# Patient Record
Sex: MALE | Employment: UNEMPLOYED | URBAN - NONMETROPOLITAN AREA
[De-identification: names, ages, dates, MRNs, and addresses within clinical notes are randomized per-mention and may not be internally consistent; named-entity substitution may affect disease eponyms.]

---

## 2022-12-01 ENCOUNTER — HOSPITAL ENCOUNTER (INPATIENT)
Age: 27
LOS: 1 days | Discharge: LEFT BEFORE DISCHARGE | End: 2022-12-02
Attending: STUDENT IN AN ORGANIZED HEALTH CARE EDUCATION/TRAINING PROGRAM | Admitting: HOSPITALIST
Payer: MEDICAID

## 2022-12-01 DIAGNOSIS — E10.10 TYPE 1 DIABETES MELLITUS WITH KETOACIDOSIS WITHOUT COMA (HCC): Primary | ICD-10-CM

## 2022-12-01 LAB
ALBUMIN SERPL-MCNC: 4.9 G/DL (ref 3.5–5.1)
ALP BLD-CCNC: 95 U/L (ref 38–126)
ALT SERPL-CCNC: 19 U/L (ref 11–66)
ANION GAP SERPL CALCULATED.3IONS-SCNC: 26 MEQ/L (ref 8–16)
AST SERPL-CCNC: 17 U/L (ref 5–40)
BASE EXCESS MIXED: -8 MMOL/L (ref -2–3)
BASOPHILS # BLD: 1.1 %
BASOPHILS ABSOLUTE: 0.1 THOU/MM3 (ref 0–0.1)
BETA-HYDROXYBUTYRATE: 66.1 MG/DL (ref 0.2–2.81)
BETA-HYDROXYBUTYRATE: 72.33 MG/DL (ref 0.2–2.81)
BILIRUB SERPL-MCNC: 0.9 MG/DL (ref 0.3–1.2)
BILIRUBIN URINE: NEGATIVE
BLOOD, URINE: NEGATIVE
BUN BLDV-MCNC: 19 MG/DL (ref 7–22)
CALCIUM SERPL-MCNC: 9.8 MG/DL (ref 8.5–10.5)
CHARACTER, URINE: CLEAR
CHLORIDE BLD-SCNC: 84 MEQ/L (ref 98–111)
CO2: 15 MEQ/L (ref 23–33)
COLLECTED BY:: ABNORMAL
COLOR: YELLOW
CREAT SERPL-MCNC: 0.8 MG/DL (ref 0.4–1.2)
DEVICE: ABNORMAL
EOSINOPHIL # BLD: 0.5 %
EOSINOPHILS ABSOLUTE: 0 THOU/MM3 (ref 0–0.4)
ERYTHROCYTE [DISTWIDTH] IN BLOOD BY AUTOMATED COUNT: 12.2 % (ref 11.5–14.5)
ERYTHROCYTE [DISTWIDTH] IN BLOOD BY AUTOMATED COUNT: 38.8 FL (ref 35–45)
GFR SERPL CREATININE-BSD FRML MDRD: > 60 ML/MIN/1.73M2
GLUCOSE BLD-MCNC: 329 MG/DL (ref 70–108)
GLUCOSE BLD-MCNC: 399 MG/DL (ref 70–108)
GLUCOSE BLD-MCNC: 427 MG/DL (ref 70–108)
GLUCOSE BLD-MCNC: 575 MG/DL (ref 70–108)
GLUCOSE BLD-MCNC: 605 MG/DL (ref 70–108)
GLUCOSE URINE: >= 1000 MG/DL
HCO3, MIXED: 18 MMOL/L (ref 23–28)
HCT VFR BLD CALC: 47.6 % (ref 42–52)
HEMOGLOBIN: 16.3 GM/DL (ref 14–18)
IMMATURE GRANS (ABS): 0.03 THOU/MM3 (ref 0–0.07)
IMMATURE GRANULOCYTES: 0.5 %
KETONES, URINE: >= 160
LEUKOCYTE ESTERASE, URINE: NEGATIVE
LYMPHOCYTES # BLD: 18.1 %
LYMPHOCYTES ABSOLUTE: 1.2 THOU/MM3 (ref 1–4.8)
MCH RBC QN AUTO: 30.1 PG (ref 26–33)
MCHC RBC AUTO-ENTMCNC: 34.2 GM/DL (ref 32.2–35.5)
MCV RBC AUTO: 87.8 FL (ref 80–94)
MONOCYTES # BLD: 4.9 %
MONOCYTES ABSOLUTE: 0.3 THOU/MM3 (ref 0.4–1.3)
NITRITE, URINE: NEGATIVE
NUCLEATED RED BLOOD CELLS: 0 /100 WBC
O2 SAT, MIXED: 59 %
OSMOLALITY CALCULATION: 281.9 MOSMOL/KG (ref 275–300)
PCO2, MIXED VENOUS: 40 MMHG (ref 41–51)
PH UA: 5 (ref 5–9)
PH, MIXED: 7.27 (ref 7.31–7.41)
PLATELET # BLD: 206 THOU/MM3 (ref 130–400)
PMV BLD AUTO: 11 FL (ref 9.4–12.4)
PO2 MIXED: 35 MMHG (ref 25–40)
POTASSIUM SERPL-SCNC: 5.1 MEQ/L (ref 3.5–5.2)
PROTEIN UA: NEGATIVE
RBC # BLD: 5.42 MILL/MM3 (ref 4.7–6.1)
SEG NEUTROPHILS: 74.9 %
SEGMENTED NEUTROPHILS ABSOLUTE COUNT: 4.9 THOU/MM3 (ref 1.8–7.7)
SODIUM BLD-SCNC: 125 MEQ/L (ref 135–145)
SPECIFIC GRAVITY, URINE: 1.03 (ref 1–1.03)
TOTAL PROTEIN: 7.6 G/DL (ref 6.1–8)
UROBILINOGEN, URINE: 0.2 EU/DL (ref 0–1)
WBC # BLD: 6.5 THOU/MM3 (ref 4.8–10.8)

## 2022-12-01 PROCEDURE — 2140000000 HC CCU INTERMEDIATE R&B

## 2022-12-01 PROCEDURE — 2580000003 HC RX 258: Performed by: STUDENT IN AN ORGANIZED HEALTH CARE EDUCATION/TRAINING PROGRAM

## 2022-12-01 PROCEDURE — 6360000002 HC RX W HCPCS: Performed by: STUDENT IN AN ORGANIZED HEALTH CARE EDUCATION/TRAINING PROGRAM

## 2022-12-01 PROCEDURE — 80053 COMPREHEN METABOLIC PANEL: CPT

## 2022-12-01 PROCEDURE — 99285 EMERGENCY DEPT VISIT HI MDM: CPT

## 2022-12-01 PROCEDURE — 85025 COMPLETE CBC W/AUTO DIFF WBC: CPT

## 2022-12-01 PROCEDURE — 36415 COLL VENOUS BLD VENIPUNCTURE: CPT

## 2022-12-01 PROCEDURE — 51798 US URINE CAPACITY MEASURE: CPT

## 2022-12-01 PROCEDURE — 96365 THER/PROPH/DIAG IV INF INIT: CPT

## 2022-12-01 PROCEDURE — 82010 KETONE BODYS QUAN: CPT

## 2022-12-01 PROCEDURE — 82948 REAGENT STRIP/BLOOD GLUCOSE: CPT

## 2022-12-01 PROCEDURE — 82803 BLOOD GASES ANY COMBINATION: CPT

## 2022-12-01 PROCEDURE — 99223 1ST HOSP IP/OBS HIGH 75: CPT | Performed by: HOSPITALIST

## 2022-12-01 PROCEDURE — 96375 TX/PRO/DX INJ NEW DRUG ADDON: CPT

## 2022-12-01 PROCEDURE — 81003 URINALYSIS AUTO W/O SCOPE: CPT

## 2022-12-01 PROCEDURE — 6370000000 HC RX 637 (ALT 250 FOR IP): Performed by: STUDENT IN AN ORGANIZED HEALTH CARE EDUCATION/TRAINING PROGRAM

## 2022-12-01 RX ORDER — 0.9 % SODIUM CHLORIDE 0.9 %
1000 INTRAVENOUS SOLUTION INTRAVENOUS ONCE
Status: COMPLETED | OUTPATIENT
Start: 2022-12-01 | End: 2022-12-02

## 2022-12-01 RX ORDER — ONDANSETRON 2 MG/ML
4 INJECTION INTRAMUSCULAR; INTRAVENOUS ONCE
Status: COMPLETED | OUTPATIENT
Start: 2022-12-01 | End: 2022-12-01

## 2022-12-01 RX ORDER — POTASSIUM CHLORIDE 7.45 MG/ML
10 INJECTION INTRAVENOUS PRN
Status: DISCONTINUED | OUTPATIENT
Start: 2022-12-01 | End: 2022-12-02 | Stop reason: HOSPADM

## 2022-12-01 RX ORDER — SODIUM CHLORIDE 9 MG/ML
INJECTION, SOLUTION INTRAVENOUS CONTINUOUS
Status: DISCONTINUED | OUTPATIENT
Start: 2022-12-01 | End: 2022-12-02

## 2022-12-01 RX ORDER — SODIUM CHLORIDE, SODIUM LACTATE, POTASSIUM CHLORIDE, AND CALCIUM CHLORIDE .6; .31; .03; .02 G/100ML; G/100ML; G/100ML; G/100ML
1000 INJECTION, SOLUTION INTRAVENOUS ONCE
Status: DISCONTINUED | OUTPATIENT
Start: 2022-12-01 | End: 2022-12-01

## 2022-12-01 RX ORDER — MAGNESIUM SULFATE IN WATER 40 MG/ML
2000 INJECTION, SOLUTION INTRAVENOUS PRN
Status: DISCONTINUED | OUTPATIENT
Start: 2022-12-01 | End: 2022-12-02 | Stop reason: HOSPADM

## 2022-12-01 RX ORDER — DEXTROSE AND SODIUM CHLORIDE 5; .45 G/100ML; G/100ML
INJECTION, SOLUTION INTRAVENOUS CONTINUOUS PRN
Status: DISCONTINUED | OUTPATIENT
Start: 2022-12-01 | End: 2022-12-02

## 2022-12-01 RX ORDER — POLYETHYLENE GLYCOL 3350 17 G/17G
17 POWDER, FOR SOLUTION ORAL DAILY PRN
Status: DISCONTINUED | OUTPATIENT
Start: 2022-12-01 | End: 2022-12-02 | Stop reason: HOSPADM

## 2022-12-01 RX ORDER — INSULIN GLARGINE 100 [IU]/ML
30 INJECTION, SOLUTION SUBCUTANEOUS NIGHTLY
COMMUNITY

## 2022-12-01 RX ORDER — INSULIN ASPART 100 [IU]/ML
1 INJECTION, SOLUTION INTRAVENOUS; SUBCUTANEOUS PRN
COMMUNITY

## 2022-12-01 RX ADMIN — ONDANSETRON 4 MG: 2 INJECTION INTRAMUSCULAR; INTRAVENOUS at 19:46

## 2022-12-01 RX ADMIN — SODIUM CHLORIDE 10.8 UNITS/HR: 9 INJECTION, SOLUTION INTRAVENOUS at 19:53

## 2022-12-01 RX ADMIN — SODIUM CHLORIDE 1000 ML: 9 INJECTION, SOLUTION INTRAVENOUS at 19:57

## 2022-12-01 ASSESSMENT — ENCOUNTER SYMPTOMS
SINUS PRESSURE: 0
SORE THROAT: 0
ABDOMINAL DISTENTION: 0
SHORTNESS OF BREATH: 0
DIARRHEA: 0
VOMITING: 1
BACK PAIN: 0
ABDOMINAL PAIN: 0
CONSTIPATION: 0
SINUS PAIN: 0
COLOR CHANGE: 0
COUGH: 0
NAUSEA: 1
CHEST TIGHTNESS: 0

## 2022-12-01 ASSESSMENT — PAIN - FUNCTIONAL ASSESSMENT: PAIN_FUNCTIONAL_ASSESSMENT: NONE - DENIES PAIN

## 2022-12-01 NOTE — ED PROVIDER NOTES
[facility]        Pt Name: Nikia Ahmadi  MRN: [id number]  [place] 1995  Date of evaluation: 12/1/2022  Physician: Andria Pennington, [address] [place] [address]       Chief Complaint   Patient presents with    Nausea    Hyperglycemia     History obtained from the patient. HISTORY OF PRESENT ILLNESS    HPI  Nikia Ahmadi is a 23 y.o. male who presents to the emergency department for evaluation of hyperglycemia. Patient has a history of type 1 diabetes. Patient reports he takes insulin regularly. Patient reports 6. Units at night and then has a sliding scale. Patient reports that he has been noticing his sugars have been significantly elevated. Patient reports that he has nausea. Patient was concerned that he is in DKA. Patient denies any pain  The patient has no other acute complaints at this time. REVIEW OF SYSTEMS   Review of Systems   Constitutional:  Negative for activity change, appetite change, fatigue and fever. HENT:  Negative for congestion, ear discharge, ear pain, sinus pressure, sinus pain and sore throat. Respiratory:  Negative for cough, chest tightness and shortness of breath. Cardiovascular:  Negative for chest pain, palpitations and leg swelling. Gastrointestinal:  Positive for nausea and vomiting. Negative for abdominal distention, abdominal pain, constipation and diarrhea. Endocrine: Positive for polydipsia and polyuria. Negative for polyphagia. Genitourinary:  Positive for frequency. Negative for dysuria, hematuria and urgency. Musculoskeletal:  Negative for arthralgias, back pain and myalgias. Skin:  Negative for color change, pallor and wound. Neurological:  Negative for dizziness, syncope, weakness, light-headedness and headaches. Psychiatric/Behavioral:  Negative for confusion, decreased concentration and dysphoric mood. All other systems reviewed and are negative.       PAST MEDICAL AND SURGICAL HISTORY     Past Medical History:   Diagnosis Date    Type 1 diabetes ([facility] [address]      History reviewed. No pertinent surgical history. MEDICATIONS     Current Facility-Administered Medications:     insulin regular (HUMULIN R;NOVOLIN R) 100 Units in sodium chloride 0.9 % 100 mL infusion, 1-50 Units/hr, IntraVENous, Continuous, Valentino Cross Anchor, DO, Last Rate: 10.8 mL/hr at 12/01/22 1953, 10.8 Units/hr at 12/01/22 1953    dextrose bolus 10% 125 mL, 125 mL, IntraVENous, PRN **OR** dextrose bolus 10% 250 mL, 250 mL, IntraVENous, PRN, [place] Cross Anchor, DO    0.9 % sodium chloride bolus, 1,000 mL, IntraVENous, Once, [place] Cross Anchor, DO, Last Rate: 495.9 mL/hr at 12/01/22 1957, 1,000 mL at 12/01/22 1957    Current Outpatient Medications:     insulin glargine (LANTUS) 100 UNIT/ML injection vial, Inject 30 Units into the skin nightly, Disp: , Rfl:     insulin aspart (NOVOLOG) 100 UNIT/ML injection vial, Inject 1 Units into the skin as needed for High Blood Sugar (Sliding scale), Disp: , Rfl:       SOCIAL HISTORY     Social History     Social History Narrative    Not on file     Social History     Tobacco Use    Smoking status: Never     Passive exposure: Never    Smokeless tobacco: Current     Types: Chew   Substance Use Topics    Alcohol use: Yes     Comment: occasiaonlly    Drug use: Never         ALLERGIES     Allergies   Allergen Reactions    Penicillins Anaphylaxis         FAMILY HISTORY   History reviewed. No pertinent family history. PREVIOUS RECORDS   Previous records reviewed: This is this patient's first visit to [facility] ED, no previous records available on EMR. .        PHYSICAL EXAM     ED Triage Vitals [12/01/22 1724]   BP Temp Temp Source Heart Rate Resp SpO2 Height Weight   105/65 97.8 °F (36.6 °C) Oral 98 18 100 % 5' 6\" (1.676 m) 123 lb (55.8 kg)     Initial vital signs and nursing assessment reviewed and normal. Body mass index is 19.85 kg/m². Pulsoximetry is normal per my interpretation.     Additional Vital Signs:  Vitals: 12/01/22 1856   BP: 105/72   Pulse: (!) 102   Resp: 26   Temp:    SpO2: 100%       Physical Exam  Constitutional:       General: He is not in acute distress. Appearance: Normal appearance. He is normal weight. He is not ill-appearing or toxic-appearing. HENT:      Head: Normocephalic and atraumatic. Right Ear: External ear normal.      Left Ear: External ear normal.      Nose: Nose normal.      Mouth/Throat:      Mouth: Mucous membranes are moist.      Pharynx: Oropharynx is clear. Eyes:      Extraocular Movements: Extraocular movements intact. Pupils: Pupils are equal, round, and reactive to light. Cardiovascular:      Rate and Rhythm: Regular rhythm. Tachycardia present. Pulmonary:      Effort: Pulmonary effort is normal.      Breath sounds: Normal breath sounds. Abdominal:      General: Abdomen is flat. Palpations: Abdomen is soft. Musculoskeletal:         General: No swelling or tenderness. Normal range of motion. Right lower leg: No edema. Left lower leg: No edema. Skin:     General: Skin is warm and dry. Capillary Refill: Capillary refill takes less than 2 seconds. Neurological:      General: No focal deficit present. Mental Status: He is alert and oriented to person, place, and time. MEDICAL DECISION MAKING   Initial Assessment:   Hyperglycemia  DKA  Plan:   POTC glucose, VBG, CBC, CMP, ketones, urinalysis    Patient had work-up initiated in the emergency department for symptoms. VBG showed acidosis with low bicarb. Patient anion gap of 26. Patient's glucose was significantly elevated 600s. As result, patient had acute diabetic ketoacidosis without coma. As result, insulin drip was initiated. Patient's potassium was 5.1 and supplementation has not initiated this time. Patient case discussed with the hospitalist who agreed to admit patient to their service. Patient be admitted at this time.     ED RESULTS   Laboratory results:  Labs Reviewed   BLOOD GAS, VENOUS - Abnormal; Notable for the following components:       Result Value    PH MIXED 7.27 (*)     PCO2, MIXED VENOUS 40 (*)     HCO3, Mixed 18 (*)     Base Exc, Mixed -8.0 (*)     All other components within normal limits   COMPREHENSIVE METABOLIC PANEL - Abnormal; Notable for the following components:    Glucose 605 (*)     Sodium 125 (*)     Chloride 84 (*)     CO2 15 (*)     All other components within normal limits   CBC WITH AUTO DIFFERENTIAL - Abnormal; Notable for the following components:    Monocytes Absolute 0.3 (*)     All other components within normal limits   BETA-HYDROXYBUTYRATE - Abnormal; Notable for the following components:    Beta-Hydroxybutyrate 72.33 (*)     All other components within normal limits   URINALYSIS WITH REFLEX TO CULTURE - Abnormal; Notable for the following components:    Glucose, Ur >= 1000 (*)     Ketones, Urine >= 160 (*)     All other components within normal limits   ANION GAP - Abnormal; Notable for the following components:    Anion Gap 26.0 (*)     All other components within normal limits   POCT GLUCOSE - Abnormal; Notable for the following components:    POC Glucose 575 (*)     All other components within normal limits   GLOMERULAR FILTRATION RATE, ESTIMATED   OSMOLALITY       Radiologic studies results:  No orders to display             ED COURSE   ED Medications administered this visit:   Medications   insulin regular (HUMULIN R;NOVOLIN R) 100 Units in sodium chloride 0.9 % 100 mL infusion (10.8 Units/hr IntraVENous New Bag 12/1/22 1953)   dextrose bolus 10% 125 mL (has no administration in time range)     Or   dextrose bolus 10% 250 mL (has no administration in time range)   0.9 % sodium chloride bolus (1,000 mLs IntraVENous New Bag 12/1/22 1957)   ondansetron (ZOFRAN) injection 4 mg (4 mg IntraVENous Given 12/1/22 1946)              MEDICATION CHANGES     New Prescriptions    No medications on file         FINAL DISPOSITION     Final diagnoses:   Type 1 diabetes mellitus with ketoacidosis without coma (HCC)     Condition: condition: stable  Dispo: Admit to telemetry      This transcription was electronically signed. It was dictated by use of voice recognition software and electronically transcribed. The transcription may contain errors not detected in proofreading.        Gavin Benítez DO  12/01/22 2043

## 2022-12-01 NOTE — ED TRIAGE NOTES
Pt presents to the ED from home with complaints of being extremely nauseous, and having high blood sugar. Pt states he is in DKA, states his blood sugar read over 600 and states he has been in DKA multiple times before. States he normally goes to [employer]. Pt is alert and oriented x4 with respirations even and unlabored.

## 2022-12-02 VITALS
HEIGHT: 66 IN | RESPIRATION RATE: 18 BRPM | OXYGEN SATURATION: 100 % | TEMPERATURE: 98 F | HEART RATE: 87 BPM | WEIGHT: 127.7 LBS | BODY MASS INDEX: 20.52 KG/M2 | SYSTOLIC BLOOD PRESSURE: 104 MMHG | DIASTOLIC BLOOD PRESSURE: 62 MMHG

## 2022-12-02 LAB
ANION GAP SERPL CALCULATED.3IONS-SCNC: 12 MEQ/L (ref 8–16)
ANION GAP SERPL CALCULATED.3IONS-SCNC: 14 MEQ/L (ref 8–16)
ANION GAP SERPL CALCULATED.3IONS-SCNC: 9 MEQ/L (ref 8–16)
BASOPHILS # BLD: 1.3 %
BASOPHILS ABSOLUTE: 0.1 THOU/MM3 (ref 0–0.1)
BUN BLDV-MCNC: 14 MG/DL (ref 7–22)
BUN BLDV-MCNC: 16 MG/DL (ref 7–22)
BUN BLDV-MCNC: 19 MG/DL (ref 7–22)
CALCIUM SERPL-MCNC: 8.1 MG/DL (ref 8.5–10.5)
CALCIUM SERPL-MCNC: 8.5 MG/DL (ref 8.5–10.5)
CALCIUM SERPL-MCNC: 8.6 MG/DL (ref 8.5–10.5)
CHLORIDE BLD-SCNC: 102 MEQ/L (ref 98–111)
CHLORIDE BLD-SCNC: 103 MEQ/L (ref 98–111)
CHLORIDE BLD-SCNC: 98 MEQ/L (ref 98–111)
CO2: 22 MEQ/L (ref 23–33)
CO2: 22 MEQ/L (ref 23–33)
CO2: 23 MEQ/L (ref 23–33)
CREAT SERPL-MCNC: 0.6 MG/DL (ref 0.4–1.2)
CREAT SERPL-MCNC: 0.7 MG/DL (ref 0.4–1.2)
CREAT SERPL-MCNC: 0.7 MG/DL (ref 0.4–1.2)
EOSINOPHIL # BLD: 3.4 %
EOSINOPHILS ABSOLUTE: 0.3 THOU/MM3 (ref 0–0.4)
ERYTHROCYTE [DISTWIDTH] IN BLOOD BY AUTOMATED COUNT: 12.1 % (ref 11.5–14.5)
ERYTHROCYTE [DISTWIDTH] IN BLOOD BY AUTOMATED COUNT: 36.6 FL (ref 35–45)
GFR SERPL CREATININE-BSD FRML MDRD: > 60 ML/MIN/1.73M2
GLUCOSE BLD-MCNC: 134 MG/DL (ref 70–108)
GLUCOSE BLD-MCNC: 139 MG/DL (ref 70–108)
GLUCOSE BLD-MCNC: 152 MG/DL (ref 70–108)
GLUCOSE BLD-MCNC: 155 MG/DL (ref 70–108)
GLUCOSE BLD-MCNC: 170 MG/DL (ref 70–108)
GLUCOSE BLD-MCNC: 174 MG/DL (ref 70–108)
GLUCOSE BLD-MCNC: 184 MG/DL (ref 70–108)
GLUCOSE BLD-MCNC: 190 MG/DL (ref 70–108)
GLUCOSE BLD-MCNC: 191 MG/DL (ref 70–108)
GLUCOSE BLD-MCNC: 194 MG/DL (ref 70–108)
GLUCOSE BLD-MCNC: 249 MG/DL (ref 70–108)
GLUCOSE BLD-MCNC: 286 MG/DL (ref 70–108)
GLUCOSE BLD-MCNC: 292 MG/DL (ref 70–108)
GLUCOSE BLD-MCNC: 396 MG/DL (ref 70–108)
GLUCOSE BLD-MCNC: 77 MG/DL (ref 70–108)
GLUCOSE BLD-MCNC: 86 MG/DL (ref 70–108)
GLUCOSE BLD-MCNC: 86 MG/DL (ref 70–108)
GLUCOSE BLD-MCNC: 87 MG/DL (ref 70–108)
HCT VFR BLD CALC: 38 % (ref 42–52)
HEMOGLOBIN: 13.6 GM/DL (ref 14–18)
IMMATURE GRANS (ABS): 0.02 THOU/MM3 (ref 0–0.07)
IMMATURE GRANULOCYTES: 0.3 %
LYMPHOCYTES # BLD: 37.9 %
LYMPHOCYTES ABSOLUTE: 3 THOU/MM3 (ref 1–4.8)
MAGNESIUM: 1.6 MG/DL (ref 1.6–2.4)
MAGNESIUM: 2.2 MG/DL (ref 1.6–2.4)
MAGNESIUM: 2.8 MG/DL (ref 1.6–2.4)
MCH RBC QN AUTO: 30.4 PG (ref 26–33)
MCHC RBC AUTO-ENTMCNC: 35.8 GM/DL (ref 32.2–35.5)
MCV RBC AUTO: 84.8 FL (ref 80–94)
MONOCYTES # BLD: 8.2 %
MONOCYTES ABSOLUTE: 0.6 THOU/MM3 (ref 0.4–1.3)
NUCLEATED RED BLOOD CELLS: 0 /100 WBC
OSMOLALITY CALCULATION: 272.5 MOSMOL/KG (ref 275–300)
OSMOLALITY CALCULATION: 273.9 MOSMOL/KG (ref 275–300)
OSMOLALITY CALCULATION: 277.1 MOSMOL/KG (ref 275–300)
PHOSPHORUS: 3.5 MG/DL (ref 2.4–4.7)
PHOSPHORUS: 3.5 MG/DL (ref 2.4–4.7)
PHOSPHORUS: 4.2 MG/DL (ref 2.4–4.7)
PLATELET # BLD: 183 THOU/MM3 (ref 130–400)
PMV BLD AUTO: 10.5 FL (ref 9.4–12.4)
POTASSIUM SERPL-SCNC: 3.8 MEQ/L (ref 3.5–5.2)
POTASSIUM SERPL-SCNC: 4.2 MEQ/L (ref 3.5–5.2)
POTASSIUM SERPL-SCNC: 4.2 MEQ/L (ref 3.5–5.2)
RBC # BLD: 4.48 MILL/MM3 (ref 4.7–6.1)
SEG NEUTROPHILS: 48.9 %
SEGMENTED NEUTROPHILS ABSOLUTE COUNT: 3.9 THOU/MM3 (ref 1.8–7.7)
SODIUM BLD-SCNC: 134 MEQ/L (ref 135–145)
SODIUM BLD-SCNC: 135 MEQ/L (ref 135–145)
SODIUM BLD-SCNC: 136 MEQ/L (ref 135–145)
WBC # BLD: 7.9 THOU/MM3 (ref 4.8–10.8)

## 2022-12-02 PROCEDURE — 82948 REAGENT STRIP/BLOOD GLUCOSE: CPT

## 2022-12-02 PROCEDURE — 2580000003 HC RX 258: Performed by: INTERNAL MEDICINE

## 2022-12-02 PROCEDURE — A4216 STERILE WATER/SALINE, 10 ML: HCPCS | Performed by: STUDENT IN AN ORGANIZED HEALTH CARE EDUCATION/TRAINING PROGRAM

## 2022-12-02 PROCEDURE — 83735 ASSAY OF MAGNESIUM: CPT

## 2022-12-02 PROCEDURE — 99238 HOSP IP/OBS DSCHRG MGMT 30/<: CPT | Performed by: INTERNAL MEDICINE

## 2022-12-02 PROCEDURE — 84100 ASSAY OF PHOSPHORUS: CPT

## 2022-12-02 PROCEDURE — 2500000003 HC RX 250 WO HCPCS: Performed by: STUDENT IN AN ORGANIZED HEALTH CARE EDUCATION/TRAINING PROGRAM

## 2022-12-02 PROCEDURE — 6370000000 HC RX 637 (ALT 250 FOR IP): Performed by: INTERNAL MEDICINE

## 2022-12-02 PROCEDURE — 85025 COMPLETE CBC W/AUTO DIFF WBC: CPT

## 2022-12-02 PROCEDURE — 36415 COLL VENOUS BLD VENIPUNCTURE: CPT

## 2022-12-02 PROCEDURE — 80048 BASIC METABOLIC PNL TOTAL CA: CPT

## 2022-12-02 PROCEDURE — 2580000003 HC RX 258: Performed by: STUDENT IN AN ORGANIZED HEALTH CARE EDUCATION/TRAINING PROGRAM

## 2022-12-02 PROCEDURE — 6360000002 HC RX W HCPCS: Performed by: HOSPITALIST

## 2022-12-02 PROCEDURE — 2580000003 HC RX 258: Performed by: HOSPITALIST

## 2022-12-02 RX ORDER — 0.9 % SODIUM CHLORIDE 0.9 %
1000 INTRAVENOUS SOLUTION INTRAVENOUS ONCE
Status: COMPLETED | OUTPATIENT
Start: 2022-12-02 | End: 2022-12-02

## 2022-12-02 RX ORDER — INSULIN LISPRO 100 [IU]/ML
0-4 INJECTION, SOLUTION INTRAVENOUS; SUBCUTANEOUS NIGHTLY
Status: DISCONTINUED | OUTPATIENT
Start: 2022-12-02 | End: 2022-12-02 | Stop reason: HOSPADM

## 2022-12-02 RX ORDER — DEXTROSE MONOHYDRATE 100 MG/ML
INJECTION, SOLUTION INTRAVENOUS CONTINUOUS PRN
Status: DISCONTINUED | OUTPATIENT
Start: 2022-12-02 | End: 2022-12-02 | Stop reason: HOSPADM

## 2022-12-02 RX ORDER — INSULIN GLARGINE 100 [IU]/ML
30 INJECTION, SOLUTION SUBCUTANEOUS NIGHTLY
Status: DISCONTINUED | OUTPATIENT
Start: 2022-12-02 | End: 2022-12-02 | Stop reason: HOSPADM

## 2022-12-02 RX ORDER — INSULIN LISPRO 100 [IU]/ML
0-4 INJECTION, SOLUTION INTRAVENOUS; SUBCUTANEOUS
Status: DISCONTINUED | OUTPATIENT
Start: 2022-12-02 | End: 2022-12-02 | Stop reason: HOSPADM

## 2022-12-02 RX ADMIN — POTASSIUM CHLORIDE 10 MEQ: 7.46 INJECTION, SOLUTION INTRAVENOUS at 02:35

## 2022-12-02 RX ADMIN — DEXTROSE AND SODIUM CHLORIDE: 5; 450 INJECTION, SOLUTION INTRAVENOUS at 08:10

## 2022-12-02 RX ADMIN — POTASSIUM CHLORIDE 10 MEQ: 7.46 INJECTION, SOLUTION INTRAVENOUS at 04:13

## 2022-12-02 RX ADMIN — MAGNESIUM SULFATE HEPTAHYDRATE 2000 MG: 40 INJECTION, SOLUTION INTRAVENOUS at 04:45

## 2022-12-02 RX ADMIN — POTASSIUM CHLORIDE 10 MEQ: 7.46 INJECTION, SOLUTION INTRAVENOUS at 11:48

## 2022-12-02 RX ADMIN — FAMOTIDINE 20 MG: 10 INJECTION, SOLUTION INTRAVENOUS at 00:08

## 2022-12-02 RX ADMIN — POTASSIUM CHLORIDE 10 MEQ: 7.46 INJECTION, SOLUTION INTRAVENOUS at 05:58

## 2022-12-02 RX ADMIN — INSULIN GLARGINE 30 UNITS: 100 INJECTION, SOLUTION SUBCUTANEOUS at 14:46

## 2022-12-02 RX ADMIN — POTASSIUM CHLORIDE 10 MEQ: 7.46 INJECTION, SOLUTION INTRAVENOUS at 13:53

## 2022-12-02 RX ADMIN — DEXTROSE MONOHYDRATE 250 ML: 100 INJECTION, SOLUTION INTRAVENOUS at 06:26

## 2022-12-02 RX ADMIN — MAGNESIUM SULFATE HEPTAHYDRATE 2000 MG: 40 INJECTION, SOLUTION INTRAVENOUS at 02:38

## 2022-12-02 RX ADMIN — SODIUM CHLORIDE 1000 ML: 9 INJECTION, SOLUTION INTRAVENOUS at 08:17

## 2022-12-02 RX ADMIN — POTASSIUM CHLORIDE 10 MEQ: 7.46 INJECTION, SOLUTION INTRAVENOUS at 10:39

## 2022-12-02 RX ADMIN — SODIUM CHLORIDE: 9 INJECTION, SOLUTION INTRAVENOUS at 00:07

## 2022-12-02 RX ADMIN — DEXTROSE AND SODIUM CHLORIDE: 5; 450 INJECTION, SOLUTION INTRAVENOUS at 01:15

## 2022-12-02 NOTE — ED NOTES
Pt transported to  28. Pt in stable condition. Floor contacted before transport.       Andreas Gonzales  12/01/22 9160

## 2022-12-02 NOTE — ED NOTES
ED to inpatient nurses report    Chief Complaint   Patient presents with    Nausea    Hyperglycemia      Present to ED from home  LOC: alert and orientated to name, place, date  Vital signs   Vitals:    12/01/22 1724 12/01/22 1856   BP: 105/65 105/72   Pulse: 98 (!) 102   Resp: 18 26   Temp: 97.8 °F (36.6 °C)    TempSrc: Oral    SpO2: 100% 100%   Weight: 123 lb (55.8 kg)    Height: 5' 6\" (1.676 m)       Oxygen Baseline room air    Current needs required room air Bipap/Cpap No  LDAs:   Peripheral IV 12/01/22 Left Antecubital (Active)   Site Assessment Clean, dry & intact 12/01/22 1734   Line Status Blood return noted; Flushed 12/01/22 1734   Dressing Status Clean, dry & intact 12/01/22 1734     Mobility: Independent  Pending ED orders: Pepcid and GI Cocktail, heartburn better  Present condition: stable    C-SSRS    Swallow Screening    Preferred Language: [place]     Electronically signed by Zoe Delgado RN on 12/1/2022 at 9:16 PM     Zoe Delgado RN  12/01/22 1312

## 2022-12-02 NOTE — DISCHARGE SUMMARY
Discharge Summary    Patient:  Antoine Santiago  YOB: 1995    MRN: [id number]   Acct: [de-identified]    Primary Care Physician: Belén Roque    Admit date:  12/1/2022    Discharge date:   12/2/2022        Discharge Diagnoses:   Type 1 diabetes mellitus with ketoacidosis without coma ([facility] [address]  Principal Problem:    Type 1 diabetes mellitus with ketoacidosis without coma ([facility] [address]  Resolved Problems:    * No resolved hospital problems. *    PT SIGNED OUT [facility]    Admitted for: [facility]: This is a pt diabetic since age 7. He presented with DKA and was treated with an insulin drip. There was no obvious provoking factor. His sugars came down nicely and he was transitioned to Lantus today. Due to pressing family family matters the pt wished to be discharged. As I could not guarantee stability I advised him I would request he sign out AMA. He was encouraged to follow up closely with his primary care next week.         Discharge Medications:       Medication List        ASK your doctor about these medications      insulin glargine 100 UNIT/ML injection vial  Commonly known as: LANTUS     NovoLOG 100 UNIT/ML injection vial  Generic drug: insulin aspart                Physical Exam:    Vitals:  Vitals:    12/02/22 1030 12/02/22 1147 12/02/22 1230 12/02/22 1645   BP: 92/63 88/62 100/77 104/62   Pulse:  87 94 87   Resp:   18 18   Temp:   97.8 °F (36.6 °C) 98 °F (36.7 °C)   TempSrc:   Oral Oral   SpO2:   100% 100%   Weight:       Height:         Weight: Weight: 127 lb 11.2 oz (57.9 kg)     24 hour intake/output:No intake or output data in the 24 hours ending 12/02/22 1735    General appearance - alert, well appearing, and in no distress  Chest - clear to auscultation, no wheezes, rales or rhonchi, symmetric air entry  Heart - normal rate, regular rhythm, normal S1, S2, no murmurs, rubs, clicks or gallops  Abdomen - soft, nontender, nondistended, no masses or organomegaly  Obese: No; Protuberant: No   Neurological - alert, oriented, normal speech, no focal findings or movement disorder noted  Extremities - peripheral pulses normal, no pedal edema, no clubbing or cyanosis  Skin - normal coloration and turgor, no rashes, no suspicious skin lesions noted          Labs can be found in the Lab tab of Chart Review    Diet:  ADULT DIET;  Regular; 4 carb choices (60 gm/meal)    Activity:  Activity as tolerated (Patient may move about with assist as indicated or with supervision.)    Follow-up:  in the next few days with Anny Wynn,    Disposition: home    Condition: stability could not be guaranteed      Time Spent: 30 minutes    Electronically signed by Maria Teresa Calderon MD on 12/2/2022 at 5:35 PM    Discharging Hospitalist

## 2022-12-02 NOTE — PROGRESS NOTES
At 1700 the patient notified this RN that [place] mom is on her death bed in [facility] and I would like to be discharged\"     At [phone] this RN notified Anil Carroll. At [facility] Dr. Rodrigue Glez Replied: \"Tell him I understand but I would have him sign out against medical advice because I don't know that he is stable if he leaves have encourage him to follow up next week with his primary care and be sure he has insulin\"    This RN notified the patient and the patient stated he would like to leave against medical advice. Dr. Rodrigue Glez notified. Dr. Rodrigue Glez replied: Celso Cheadle have him continue his usual insulin regimen\"    Patients IV and telemetry monitor removed. Patient signed Against Medical Advice paper.

## 2022-12-02 NOTE — PLAN OF CARE
Problem: Discharge Planning  Goal: Discharge to home or other facility with appropriate resources  Outcome: Progressing  Flowsheets  Taken 12/2/2022 1131  Discharge to home or other facility with appropriate resources:   Identify barriers to discharge with patient and caregiver   Arrange for needed discharge resources and transportation as appropriate  Taken 12/2/2022 0800  Discharge to home or other facility with appropriate resources: Identify barriers to discharge with patient and caregiver  Note: Replacing electrolytes, still on insulin gtt, anion gap closed. Problem: Cardiovascular - Adult  Goal: Maintains optimal cardiac output and hemodynamic stability  Outcome: Progressing  Flowsheets (Taken 12/2/2022 1131)  Maintains optimal cardiac output and hemodynamic stability:   Administer fluid and/or volume expanders as ordered   Monitor blood pressure and heart rate   Monitor urine output and notify Licensed Independent Practitioner for values outside of normal range  Note: 1L NS fluid bolus administered for hypotension     Problem: Cardiovascular - Adult  Goal: Absence of cardiac dysrhythmias or at baseline  Outcome: Progressing  Flowsheets (Taken 12/2/2022 1131)  Absence of cardiac dysrhythmias or at baseline:   Monitor cardiac rate and rhythm   Assess for signs of decreased cardiac output  Note: Patient in NSR    Care plan reviewed with patient. Patient verbalizes understanding of the care plan and contributed to goal setting.

## 2022-12-02 NOTE — PROGRESS NOTES
Assessment and Plan:        Dka- gap closed: restart home insulin      CC:  nausea, emesis  HPI: pt with type I dm, sugars usu high in 200s; developed dka; precipitating factor unclear; his gap closed today and he feels like eating. Will transition to lantus. ROS (12 point review of systems completed. Pertinent positives noted. Otherwise ROS is negative) :   PMH:  Per HPI  SHX:  single, nonsmoker  FHX: Noncontributory    Allergies: See above    Medications:     dextrose      insulin 2.3 Units/hr (12/02/22 1245)      insulin glargine  30 Units SubCUTAneous Nightly    insulin lispro  0-4 Units SubCUTAneous TID WC    insulin lispro  0-4 Units SubCUTAneous Nightly    GI cocktail   Oral Once       Vital Signs:   /77   Pulse 94   Temp 97.8 °F (36.6 °C) (Oral)   Resp 18   Ht 5' 6\" (1.676 m)   Wt 127 lb 11.2 oz (57.9 kg)   SpO2 100%   BMI 20.61 kg/m²    No intake or output data in the 24 hours ending 12/02/22 1440     Physical Examination: General appearance - alert, well appearing, and in no distress  Mental status - alert, oriented to person, place, and time  Neck - supple, no significant adenopathy, no JVD, or carotid bruits  Chest - clear to auscultation, no wheezes, rales or rhonchi, symmetric air entry  Heart - normal rate, regular rhythm, normal S1, S2, no murmurs, rubs, clicks or gallops  Abdomen - soft, nontender, nondistended, no masses or organomegaly  Neurological - alert, oriented, normal speech, no focal findings or movement disorder noted  Musculoskeletal - no joint tenderness, deformity or swelling  Extremities - peripheral pulses normal, no pedal edema, no clubbing or cyanosis  Skin - normal coloration and turgor, no rashes, no suspicious skin lesions noted    Data: (All radiographs, tracings, PFTs, and imaging are personally viewed and interpreted unless otherwise noted).    Reviewed labs      Electronically signed by Cari Parker MD on 12/2/2022 at 2:40 PM

## 2022-12-02 NOTE — CARE COORDINATION
Case Management Assessment  Initial Evaluation    Date/Time of Evaluation: 12/2/2022 1:39 PM  Assessment Completed by: Berhane Benson RN    If patient is discharged prior to next notation, then this note serves as note for discharge by case management. Patient Name: James Denson                   YOB: 1995  Diagnosis: DKA, type 1, not at goal [facility]) [E10.10]  Type 1 diabetes mellitus with ketoacidosis without coma ([facility] [address] [E10.10]                   Date / Time: 12/1/2022  5:19 PM    Patient Admission Status: Inpatient     Current PCP: Suzanna Chandra  PCP verified by CM? Yes    Chart Reviewed: Yes      Patient Orientation: Alert and Oriented    Patient Cognition: Alert  History Provided by: Patient    Hospitalization in the last 30 days (Readmission):  No    If yes, Readmission Assessment in CM Navigator will be completed. Advance Directives:     Code Status: Full Code       Discharge Planning  Patient lives with: Friends Type of Home: House   Primary Caregiver: Self  Patient Support Systems include: Friends/Neighbors, Parent, Family Members   Current Financial resources: Medicaid  Current community resources: Other (Comment) (Diabetes clinic in [facility])  Current services prior to admission: None   Type of Home Care services:  None    ADLS  Prior functional level: Independent in ADLs/IADLs  Current functional level: Independent in ADLs/IADLs      Family can provide assistance at DC: Yes  Would you like Case Management to discuss the discharge plan with any other family members/significant others, and if so, who?  No  Plans to Return to Present Housing: Yes  Other Identified Issues/Barriers to RETURNING to current housing: None  Potential Assistance needed at discharge: N/A  Patient expects to discharge to: [address] [place] [address] for transportation at discharge: Friends    Financial  Payor: MEDICAID [place] / Plan: [facility] DEPT [address] / Product Type: *No Product type* /     Does insurance require precert for SNF: No    Potential assistance Purchasing Medications: No  Meds-to-Beds request: No    No Pharmacies Listed    Factors facilitating achievement of predicted outcomes: Family support, Friend support, Cooperative, Pleasant, and Has needed Durable Medical Equipment at home    Barriers to discharge: Medical complications    Additional Case Management Notes: Admitted through ED with nausea, weakness, and possible DKA. Glucose upon arrival was 605 with anion gap of 26. Bicarb 15. Started on insulin gtt. Hgb down to 170. Magnesium 2.8. Beta-Hydroxybutyrate 66.10. Procedure: None    The Plan for Transition of Care is related to the following treatment goals of DKA, type 1, not at goal [facility]) [E10.10]  Type 1 diabetes mellitus with ketoacidosis without coma ([facility] [address] [E10.10]    Patient Goals/Plan/Treatment Preferences: Plans home with roommate/friend. Denies needs. Transportation/Food Security/Housekeeping Addressed: No issues identified. Leigh Ann Hurtado RN  Case Management Department    Possible weekend discharge. 12/2/22, 1:42 PM EST    Patient goals/plan/ treatment preferences discussed by  and . Patient goals/plan/ treatment preferences reviewed with patient/ family. Patient/ family verbalize understanding of discharge plan and are in agreement with goal/plan/treatment preferences. Understanding was demonstrated using the teach back method. AVS provided by RN at time of discharge, which includes all necessary medical information pertaining to the patients current course of illness, treatment, post-discharge goals of care, and treatment preferences.      Services At/After Discharge: None

## 2022-12-02 NOTE — H&P
History & Physical        Patient:  Zack Piedra  YOB: 1995    MRN: [id number]     Acct: [de-identified]    PCP: Migdalia Toscano    Date of Admission: 12/1/2022    Date of Service: Pt seen/examined on 12/01/22  and Admitted to Inpatient with expected LOS greater than two midnights due to medical therapy. Chief Complaint: DKA    History Of Present Illness:  Zack Piedra is a 23 y.o. male with PMHx of type 1 diabetes mellitus diagnosed at age 7, who presents to the hospital with complaints of persistent nausea, weakness, polydipsia, polyuria, and findings suggestive of DKA. Patient lives in [place] [place] [facility]". He was visiting some friends here in the area. Patient stated that he was of normal state of health yesterday. His symptoms of nausea started today. He denied any fever, chills, coughing, shortness of breath, dysuria, chest pain, or diaphoresis. Additionally, no complaints of diarrhea. Patient had emesis only upon arrival to ED. He reports that he is compliant with his insulin. He had previously been on an insulin pump on and off since age 16, but currently has been having issues obtaining supplies for his insulin pump. He therefore is currently on back on insulin monotherapy. Patient states he has had prior admissions for DKA, usually when he is not using the insulin pump. His last admission for DKA was over a year ago. In the ED, patient was noted to be tachycardic. Presenting glucose was 605 with anion gap of 26, and serum bicarb of 15. VBG demonstrates pH of 7.27. Patient was started on insulin drip. We were asked to admit this patient for further management. Past Medical History:          Diagnosis Date    [facility]        Past Surgical History:      History reviewed. No pertinent surgical history. Medications Prior to Admission:      Prior to Admission medications    Medication Sig Start Date End Date Taking?  Authorizing Provider   insulin glargine (LANTUS) 100 UNIT/ML injection vial Inject 30 Units into the skin nightly   Yes Historical Provider, MD   insulin aspart (NOVOLOG) 100 UNIT/ML injection vial Inject 1 Units into the skin as needed for High Blood Sugar (Sliding scale)   Yes Historical Provider, MD       Allergies:  Penicillins    Social History:      The patient currently lives alone. TOBACCO:   reports that he has never smoked. He has never been exposed to tobacco smoke. His smokeless tobacco use includes chew. ETOH:   reports current alcohol use. DRUG USE HISTORY: Denies      Family History: Mother: Mother without reported past medical histories. Father: Father without reported past medical histories. REVIEW OF SYSTEMS:   12 points of systems reviewed and otherwise negative except for that which already was noted above. PHYSICAL EXAM:    /72   Pulse (!) 102   Temp 97.8 °F (36.6 °C) (Oral)   Resp 26   Ht 5' 6\" (1.676 m)   Wt 123 lb (55.8 kg)   SpO2 100%   BMI 19.85 kg/m²     General appearance: Patient appears lethargic, but alert and oriented x3. Eyes:  Pupils equal, round, and reactive to light. Conjunctivae/corneas clear. HENT: Head normal in appearance. External nares normal.  Oral mucosa dry without lesions. Hearing grossly intact. Neck: Supple, with full range of motion. Trachea midline. No  JVD appreciated. Respiratory:  Normal respiratory effort. Clear to auscultation, bilaterally without rales or wheezes or rhonchi. Cardiovascular: Tachycardic, regular rhythm with normal S1/S2 without murmurs. No lower extremity edema. Abdomen: Soft, non-tender, non-distended with normal bowel sounds. Musculoskeletal: There is no joint swelling or tenderness. Normal tone. No abnormal movements. Skin: Warm and dry. No rashes or lesions. Neurologic:  No focal sensory/motor deficits in the upper and lower extremities. Cranial nerves:  grossly non-focal 2-12.   Psychiatric: normal insight and though content. Capillary Refill: Brisk,< 3 seconds. Peripheral Pulses: +2 palpable, equal bilaterally. Labs:     Recent Labs     12/01/22 1752   WBC 6.5   HGB 16.3   HCT 47.6        Recent Labs     12/01/22 1752   *   K 5.1   CL 84*   CO2 15*   BUN 19   CREATININE 0.8   CALCIUM 9.8     Recent Labs     12/01/22 1752   AST 17   ALT 19   BILITOT 0.9   ALKPHOS 95     No results for input(s): INR in the last 72 hours. No results for input(s): Laban Woodrow in the last 72 hours. Urinalysis:      Lab Results   Component Value Date/Time    NITRU NEGATIVE 12/01/2022 05:50 PM    BLOODU NEGATIVE 12/01/2022 05:50 PM    GLUCOSEU >= 1000 12/01/2022 05:50 PM       Intake & Output:  No intake/output data recorded. No intake/output data recorded. No orders to display            Assessment And Plan:    1. DKA: Patient will be admitted to telemetry. He was started on insulin drip. We will continue with every 4 hours metabolic panel to follow closure of anion gap. We will continue with aggressive fluid resuscitation, and follow patient's clinical course. 2.  Hyponatremia: Likely secondary to hyperglycemia. We should expect normalization of sodium with resolution of DKA. 3.  Tachycardia: Likely secondary to poor volume status in the setting of DKA. Patient will receive aggressive fluid resuscitation, and will continue monitor on telemetry. 4.  Tobacco use: Patient states that he chews tobacco.  He declines nicotine replacement therapy. Counseled on cessation. 5.  DVT prophylaxis: Low risk, defer pharmacologic DVT prophylactic agents. We will order SCDs while in bed, and encourage ambulation once patient is clinically improved. Code Status: Full Code    Thank you Juanis Burrows for the opportunity to be involved in this patient's care.     Electronically signed by Graciela Arauz MD on 12/1/2022 at 9:14 PM